# Patient Record
Sex: FEMALE
[De-identification: names, ages, dates, MRNs, and addresses within clinical notes are randomized per-mention and may not be internally consistent; named-entity substitution may affect disease eponyms.]

---

## 2023-03-16 ENCOUNTER — RX ONLY (RX ONLY)
Age: 53
End: 2023-03-16

## 2023-03-16 ENCOUNTER — OPTICAL OFFICE (OUTPATIENT)
Dept: URBAN - NONMETROPOLITAN AREA CLINIC 5 | Facility: CLINIC | Age: 53
Setting detail: OPHTHALMOLOGY
End: 2023-03-16
Payer: COMMERCIAL

## 2023-03-16 ENCOUNTER — DOCTOR'S OFFICE (OUTPATIENT)
Dept: URBAN - NONMETROPOLITAN AREA CLINIC 2 | Facility: CLINIC | Age: 53
Setting detail: OPHTHALMOLOGY
End: 2023-03-16
Payer: COMMERCIAL

## 2023-03-16 DIAGNOSIS — H52.03: ICD-10-CM

## 2023-03-16 PROBLEM — Z01.00 GOOD OCULAR HEALTH OU: Status: ACTIVE | Noted: 2023-03-16

## 2023-03-16 PROCEDURE — V2784 LENS POLYCARB OR EQUAL: HCPCS | Performed by: OPTOMETRIST

## 2023-03-16 PROCEDURE — V2744 TINT PHOTOCHROMATIC LENS/ES: HCPCS | Performed by: OPTOMETRIST

## 2023-03-16 PROCEDURE — V2020 VISION SVCS FRAMES PURCHASES: HCPCS | Performed by: OPTOMETRIST

## 2023-03-16 PROCEDURE — V2203 LENS SPHCYL BIFOCAL 4.00D/.1: HCPCS | Performed by: OPTOMETRIST

## 2023-03-16 PROCEDURE — V2750 ANTI-REFLECTIVE COATING: HCPCS | Performed by: OPTOMETRIST

## 2023-03-16 PROCEDURE — V2781 PROGRESSIVE LENS PER LENS: HCPCS | Performed by: OPTOMETRIST

## 2023-03-16 PROCEDURE — 92004 COMPRE OPH EXAM NEW PT 1/>: CPT | Performed by: OPTOMETRIST

## 2023-03-16 ASSESSMENT — REFRACTION_MANIFEST
OS_SPHERE: +0.50
OD_VA2: 20/20-2
OS_VA2: 20/20
OD_CYLINDER: -0.75
OS_VA1: 20/20
OS_ADD: +2.25
OD_AXIS: 120
OS_CYLINDER: -0.50
OD_SPHERE: +0.75
OD_VA1: 20/20-2
OD_ADD: +2.25
OS_AXIS: 085

## 2023-03-16 ASSESSMENT — VISUAL ACUITY
OS_BCVA: 20/25+1
OD_BCVA: 20/20-1

## 2023-03-16 ASSESSMENT — REFRACTION_AUTOREFRACTION
OS_CYLINDER: -0.75
OD_AXIS: 123
OD_CYLINDER: -0.75
OD_SPHERE: +0.75
OS_SPHERE: +0.50
OS_AXIS: 085

## 2023-03-16 ASSESSMENT — REFRACTION_CURRENTRX
OD_OVR_VA: 20/
OS_OVR_VA: 20/

## 2023-03-16 ASSESSMENT — SPHEQUIV_DERIVED
OD_SPHEQUIV: 0.375
OD_SPHEQUIV: 0.375
OS_SPHEQUIV: 0.25
OS_SPHEQUIV: 0.125

## 2023-03-16 ASSESSMENT — CONFRONTATIONAL VISUAL FIELD TEST (CVF)
OD_FINDINGS: FULL
OS_FINDINGS: FULL

## 2023-03-16 ASSESSMENT — TONOMETRY
OD_IOP_MMHG: 14
OS_IOP_MMHG: 14

## 2023-12-08 RX ORDER — IBUPROFEN 800 MG/1
800 TABLET ORAL AS NEEDED
COMMUNITY
Start: 2023-10-25

## 2023-12-08 RX ORDER — LEVOTHYROXINE SODIUM 175 UG/1
175 TABLET ORAL DAILY
COMMUNITY
Start: 2023-10-11

## 2023-12-08 RX ORDER — MONTELUKAST SODIUM 10 MG/1
10 TABLET ORAL DAILY
COMMUNITY
Start: 2023-10-11

## 2023-12-08 RX ORDER — OXYCODONE HYDROCHLORIDE 5 MG/1
5 TABLET ORAL EVERY 6 HOURS PRN
COMMUNITY
Start: 2023-10-25

## 2023-12-08 RX ORDER — OMEPRAZOLE 20 MG/1
1 CAPSULE, DELAYED RELEASE ORAL DAILY PRN
COMMUNITY

## 2023-12-08 RX ORDER — ATORVASTATIN CALCIUM 80 MG/1
80 TABLET, FILM COATED ORAL DAILY
COMMUNITY
Start: 2023-10-11

## 2023-12-08 RX ORDER — ACETAMINOPHEN 500 MG
500 TABLET ORAL DAILY PRN
COMMUNITY
Start: 2023-10-25

## 2023-12-10 ENCOUNTER — TELEPHONE (OUTPATIENT)
Dept: OTHER | Facility: OTHER | Age: 53
End: 2023-12-10

## 2023-12-10 NOTE — TELEPHONE ENCOUNTER
Patient called today regarding she lives an hour away, and if the weather is bad she will call to cancel her Appointment that is Scheduled for 12- @ 08:30 am.

## 2023-12-11 ENCOUNTER — OFFICE VISIT (OUTPATIENT)
Dept: UROLOGY | Facility: CLINIC | Age: 53
End: 2023-12-11
Payer: COMMERCIAL

## 2023-12-11 VITALS
HEIGHT: 64 IN | BODY MASS INDEX: 37.8 KG/M2 | SYSTOLIC BLOOD PRESSURE: 110 MMHG | DIASTOLIC BLOOD PRESSURE: 66 MMHG | WEIGHT: 221.4 LBS | TEMPERATURE: 98 F | OXYGEN SATURATION: 99 %

## 2023-12-11 DIAGNOSIS — R32 URINARY INCONTINENCE, UNSPECIFIED TYPE: Primary | ICD-10-CM

## 2023-12-11 LAB
SL AMB  POCT GLUCOSE, UA: NORMAL
SL AMB LEUKOCYTE ESTERASE,UA: NORMAL
SL AMB POCT BILIRUBIN,UA: NORMAL
SL AMB POCT BLOOD,UA: NORMAL
SL AMB POCT CLARITY,UA: CLEAR
SL AMB POCT COLOR,UA: YELLOW
SL AMB POCT KETONES,UA: NORMAL
SL AMB POCT NITRITE,UA: NORMAL
SL AMB POCT PH,UA: 5.5
SL AMB POCT SPECIFIC GRAVITY,UA: 1.02
SL AMB POCT URINE PROTEIN: NORMAL
SL AMB POCT UROBILINOGEN: 0.2

## 2023-12-11 PROCEDURE — 81003 URINALYSIS AUTO W/O SCOPE: CPT | Performed by: UROLOGY

## 2023-12-11 PROCEDURE — 99203 OFFICE O/P NEW LOW 30 MIN: CPT | Performed by: UROLOGY

## 2023-12-11 RX ORDER — TOLTERODINE 4 MG/1
4 CAPSULE, EXTENDED RELEASE ORAL DAILY
Qty: 90 CAPSULE | Refills: 3 | Status: SHIPPED | OUTPATIENT
Start: 2023-12-11

## 2023-12-11 RX ORDER — ONDANSETRON 4 MG/1
4 TABLET, FILM COATED ORAL 2 TIMES DAILY PRN
COMMUNITY
Start: 2023-10-06

## 2023-12-11 NOTE — PROGRESS NOTES
UROLOGY PROGRESS NOTE         NAME: Erinn Montenegro  AGE: 48 y.o. SEX: female  : 1970   MRN: [id number]    DATE: 2023  TIME: 9:31 AM    Assessment and Plan      Impression:   1. Urinary incontinence, unspecified type  -     POCT urine dip auto non-scope  -     tolterodine (DETROL LA) 4 mg 24 hr capsule; Take 1 capsule (4 mg total) by mouth daily      Mixed urinary incontinence. Bladder neck hypermobility. Failed Kegel exercises   Plan: Options discussed. Behavioral therapy and physical therapy, sling procedure, OAB medications. Pelvic ultrasound pending with her gynecologist  Try overactive bladder medications. Detrol LA 4 mg daily. Follow-up 1 month        Chief Complaint     Chief Complaint   Patient presents with    Urinary Incontinence    Urinary Urgency     History of Present Illness     HPI: Nasima Neal is a 48y.o. year old female who presents with significant incontinence. Currently using 2 thick pads per day. She has both stress and urge incontinence. The urge incontinence is gotten worse and previously she was at not wearing pads but has been wearing pads more in the last recent past.  She leaks mainly with urge. Minimal leakage with stress incontinence. No trouble with sexual activity. No trouble with her bowels. No previous pelvic surgery. She does have pelvic ultrasound pending because of uterine lining issues. No kidney stone issues no significant history of UTIs. .  Non-smoker. The urgency and frequency is very bothersome. Currently she has nocturia 2 times per night. She is to void roughly every hour during the day with significant urgency.               The following portions of the patient's history were reviewed and updated as appropriate: allergies, current medications, past family history, past medical history, past social history, past surgical history and problem list.  Past Medical History:   Diagnosis Date    Biceps muscle tear     High cholesterol Past Surgical History:   Procedure Laterality Date    BICEPS TENDON REPAIR       shoulder  Review of Systems     Const: Denies chills, fever and weight loss. CV: Denies chest pain. Resp: Denies SOB. GI: Denies abdominal pain, nausea and vomiting. : Denies symptoms other than stated above. Musculo: Denies back pain. Objective   /66   Temp 98 °F (36.7 °C)   Ht 5' 4" (1.626 m)   Wt 100 kg (221 lb 6.4 oz)   SpO2 99%   BMI 38.00 kg/m²     Physical Exam  Const: Appears healthy and well developed. No signs of acute distress present. Resp: Respirations are regular and unlabored. CV: Rate is regular. Rhythm is regular. Abdomen: Abdomen is soft, nontender, and nondistended. Kidneys are not palpable. : Bladder neck hypermobility. No pelvic masses. Well estrogenized. Psych: Patient's attitude is cooperative.  Mood is normal. Affect is normal.    Current Medications     Current Outpatient Medications:     acetaminophen (TYLENOL) 500 mg tablet, Take 500 mg by mouth daily as needed, Disp: , Rfl:     atorvastatin (LIPITOR) 80 mg tablet, Take 80 mg by mouth daily, Disp: , Rfl:     ibuprofen (MOTRIN) 800 mg tablet, Take 800 mg by mouth if needed, Disp: , Rfl:     levothyroxine (Synthroid) 175 mcg tablet, Take 175 mcg by mouth daily Rotating with 200mcg, Disp: , Rfl:     montelukast (Singulair) 10 mg tablet, Take 10 mg by mouth daily, Disp: , Rfl:     omeprazole (PriLOSEC) 20 mg delayed release capsule, Take 1 capsule by mouth daily as needed, Disp: , Rfl:     ondansetron (ZOFRAN) 4 mg tablet, Take 4 mg by mouth 2 (two) times a day as needed for nausea, Disp: , Rfl:     oxyCODONE (ROXICODONE) 5 immediate release tablet, Take 5 mg by mouth every 6 (six) hours as needed, Disp: , Rfl:     sertraline (ZOLOFT) 50 mg tablet, Take 50 mg by mouth daily, Disp: , Rfl:     tolterodine (DETROL LA) 4 mg 24 hr capsule, Take 1 capsule (4 mg total) by mouth daily, Disp: 90 capsule, Rfl: [address] [facility], [place]

## 2024-01-11 ENCOUNTER — TELEPHONE (OUTPATIENT)
Dept: UROLOGY | Facility: CLINIC | Age: 54
End: 2024-01-11

## 2024-01-11 NOTE — TELEPHONE ENCOUNTER
Call to pt to see how the oab med was working and if she had a u/s per gynocologist. If she had the u/s, she should have a copy with her on her next appt with Dr. D'Amico.

## 2024-01-12 NOTE — TELEPHONE ENCOUNTER
VM left for pt let us know what she would like to do for her appt. Pt called me back immediately and rescheduled her appt. For Feb. She will get her U/S on a disc and have with for her appt.

## 2024-01-12 NOTE — TELEPHONE ENCOUNTER
VM left for pt to call back and reschedule her visit, does she want it vv or come in, but should have the u/s disc with her.

## 2024-01-12 NOTE — TELEPHONE ENCOUNTER
Pt retuning call . Pt stated she is having less leakage but there is still leakage when she is coughing , sneezing urgency. Pt not sure how much of a difference she is supposed to see.      Pt also stated that she did have the US done(in care everywhere)  IMPRESSION:   1. Thickened endometrium measuring up to 13 mm with a heterogeneous appearance.  This may be related to the patient's menstrual cycle.   2. Bilateral adnexal dominant follicles.     Does she need to get the disk    Pt requesting to have kelli as a VV is possible if not it will need to be rescheduled.      Please let pt know.pt can be reached at   [phone]

## 2024-02-16 RX ORDER — IBUPROFEN 800 MG/1
800 TABLET ORAL EVERY 8 HOURS PRN
COMMUNITY
Start: 2023-10-25

## 2024-02-20 ENCOUNTER — OFFICE VISIT (OUTPATIENT)
Dept: UROLOGY | Facility: CLINIC | Age: 54
End: 2024-02-20
Payer: COMMERCIAL

## 2024-02-20 VITALS
TEMPERATURE: 98 F | DIASTOLIC BLOOD PRESSURE: 60 MMHG | SYSTOLIC BLOOD PRESSURE: 108 MMHG | HEIGHT: 64 IN | OXYGEN SATURATION: 96 % | HEART RATE: 81 BPM | WEIGHT: 217 LBS | BODY MASS INDEX: 37.05 KG/M2

## 2024-02-20 DIAGNOSIS — N39.3 STRESS INCONTINENCE OF URINE: Primary | ICD-10-CM

## 2024-02-20 DIAGNOSIS — R32 URINARY INCONTINENCE, UNSPECIFIED TYPE: ICD-10-CM

## 2024-02-20 LAB
SL AMB  POCT GLUCOSE, UA: NORMAL
SL AMB LEUKOCYTE ESTERASE,UA: NORMAL
SL AMB POCT BILIRUBIN,UA: NORMAL
SL AMB POCT BLOOD,UA: NORMAL
SL AMB POCT CLARITY,UA: CLEAR
SL AMB POCT COLOR,UA: YELLOW
SL AMB POCT KETONES,UA: NORMAL
SL AMB POCT NITRITE,UA: NORMAL
SL AMB POCT PH,UA: 5.5
SL AMB POCT SPECIFIC GRAVITY,UA: 1.03
SL AMB POCT URINE PROTEIN: NORMAL
SL AMB POCT UROBILINOGEN: 0.2

## 2024-02-20 PROCEDURE — 81003 URINALYSIS AUTO W/O SCOPE: CPT | Performed by: UROLOGY

## 2024-02-20 PROCEDURE — 99213 OFFICE O/P EST LOW 20 MIN: CPT | Performed by: UROLOGY

## 2024-02-20 RX ORDER — TOLTERODINE 4 MG/1
4 CAPSULE, EXTENDED RELEASE ORAL DAILY
Qty: 90 CAPSULE | Refills: 3 | Status: SHIPPED | OUTPATIENT
Start: 2024-02-20

## 2024-02-20 NOTE — PROGRESS NOTES
UROLOGY PROGRESS NOTE         NAME: Erinn Montenegro  AGE: 54 y.o. SEX: female  : 1970   MRN: [id number]    DATE: 2024  TIME: 9:36 AM    Assessment and Plan      Impression:   1. Stress incontinence of urine  -     POCT urine dip auto non-scope    2. Urinary incontinence, unspecified type  -     tolterodine (DETROL LA) 4 mg 24 hr capsule; Take 1 capsule (4 mg total) by mouth daily    Pros cons of a sling procedure discussed.  Continue medications discussed.  She would like continue [facility] for now.     Plan: Continue Detrol LA 4 mg daily.  Consider procedure in the future.  Follow-up 1 year sooner if troubles.      Chief Complaint     Chief Complaint   Patient presents with    Follow-up    Urinary Incontinence    improvement noted with use of detrol     History of Present Illness     HPI: Erinn Montenegro is a 54 y.o. year old female who presents with history of mixed incontinence mainly urge.  Detrol LA is made a difference.  She is now using 1 thinner pad per 24 hours.  She has more time to get to the bathroom.  She has nocturia once per night.  She is voiding every few hours during the day.  Not as disruptive.  Discussed the possibility of stress-induced detrusor instability as the cause in which a sling might help her leakage troubles.  Currently the trouble is not that bothersome and she would like to avoid a sling for now.  She does have bladder neck hypermobility.              The following portions of the patient's history were reviewed and updated as appropriate: allergies, current medications, past family history, past medical history, past social history, past surgical history and problem list.  Past Medical History:   Diagnosis Date    Biceps muscle tear     High cholesterol     Urinary incontinence     UTI (urinary tract infection)      Past Surgical History:   Procedure Laterality Date    BICEPS TENDON REPAIR Right      shoulder  Review of Systems     Const: Denies chills, fever  "and weight loss.  CV: Denies chest pain.  Resp: Denies SOB.  GI: Denies abdominal pain, nausea and vomiting.  : Denies symptoms other than stated above.  Musculo: Denies back pain.    Objective   /60   Pulse 81   Temp 98 °F (36.7 °C)   Ht 5' 4\" (1.626 m)   Wt 98.4 kg (217 lb)   SpO2 96%   BMI 37.25 kg/m²     Physical Exam  Const: Appears healthy and well developed. No signs of acute distress present.  Resp: Respirations are regular and unlabored.   CV: Rate is regular. Rhythm is regular.  Abdomen: Abdomen is soft, nontender, and nondistended. Kidneys are not palpable.  : No exam today.  Psych: Patient's attitude is cooperative. Mood is normal. Affect is normal.    Current Medications     Current Outpatient Medications:     acetaminophen (TYLENOL) 500 mg tablet, Take 500 mg by mouth daily as needed, Disp: , Rfl:     atorvastatin (LIPITOR) 80 mg tablet, Take 80 mg by mouth daily, Disp: , Rfl:     ibuprofen (MOTRIN) 800 mg tablet, Take 800 mg by mouth if needed, Disp: , Rfl:     ibuprofen (MOTRIN) 800 mg tablet, Take 800 mg by mouth every 8 (eight) hours as needed, Disp: , Rfl:     levothyroxine (Synthroid) 175 mcg tablet, Take 175 mcg by mouth daily Rotating with 200mcg, Disp: , Rfl:     montelukast (Singulair) 10 mg tablet, Take 10 mg by mouth daily, Disp: , Rfl:     sertraline (ZOLOFT) 50 mg tablet, Take 50 mg by mouth daily, Disp: , Rfl:     tolterodine (DETROL LA) 4 mg 24 hr capsule, Take 1 capsule (4 mg total) by mouth daily, Disp: 90 capsule, Rfl: 3    omeprazole (PriLOSEC) 20 mg delayed release capsule, Take 1 capsule by mouth daily as needed (Patient not taking: Reported on 2/20/2024), Disp: , Rfl:     ondansetron (ZOFRAN) 4 mg tablet, Take 4 mg by mouth 2 (two) times a day as needed for nausea (Patient not taking: Reported on 2/20/2024), Disp: , Rfl:     oxyCODONE (ROXICODONE) 5 immediate release tablet, Take 5 mg by mouth every 6 (six) hours as needed (Patient not taking: Reported on " 2/20/2024), Disp: , Rfl:         Frank D'Amico, MD

## 2024-09-16 DIAGNOSIS — R32 URINARY INCONTINENCE, UNSPECIFIED TYPE: ICD-10-CM

## 2024-09-16 RX ORDER — TOLTERODINE 4 MG/1
4 CAPSULE, EXTENDED RELEASE ORAL DAILY
Qty: 90 CAPSULE | Refills: 1 | Status: SHIPPED | OUTPATIENT
Start: 2024-09-16

## 2024-09-16 NOTE — TELEPHONE ENCOUNTER
Reason for call:   [x] Refill   [] Prior Auth  [] Other:     Office:   [] PCP/Provider -   [x] Specialty/Provider - Frank D'Amico, MD / [facility]     Medication: tolterodine (DETROL LA) 4 mg 24 hr capsule /  Take 1 capsule (4 mg total) by mouth daily     Pharmacy: [facility]/pharmacy #[id number] - [place] - [address]     Does the patient have enough for 3 days?   [x] Yes   [] No - Send as HP to POD

## 2025-03-31 ENCOUNTER — TELEPHONE (OUTPATIENT)
Age: 55
End: 2025-03-31

## 2025-03-31 DIAGNOSIS — R32 URINARY INCONTINENCE, UNSPECIFIED TYPE: ICD-10-CM

## 2025-03-31 NOTE — TELEPHONE ENCOUNTER
Medication:   tolterodine (DETROL LA) 4 mg 24 hr capsule      Dose/Frequency: 4 mg / Take 1 capsule (4 mg total) by mouth daily    Quantity:  90 capsule     Pharmacy: [facility]/pharmacy #[id number] - [place] - [address]  [address] [address]  Phone: [phone]  Fax: [phone]  CASSIE #: [id number]     Office:   [] PCP/Provider -   [x] Speciality/Provider - Frank D'Amico, MD  [address] [facility] [place] [address]  CASSIE #: [id number]   NPI: [id number]     Does the patient have enough for 3 days?   [x] Yes   [] No - Send as HP to POD

## 2025-04-01 RX ORDER — TOLTERODINE 4 MG/1
4 CAPSULE, EXTENDED RELEASE ORAL DAILY
Qty: 90 CAPSULE | Refills: 1 | Status: SHIPPED | OUTPATIENT
Start: 2025-04-01